# Patient Record
Sex: MALE | Race: WHITE | Employment: FULL TIME | URBAN - METROPOLITAN AREA
[De-identification: names, ages, dates, MRNs, and addresses within clinical notes are randomized per-mention and may not be internally consistent; named-entity substitution may affect disease eponyms.]

---

## 2020-04-21 ENCOUNTER — OFFICE VISIT (OUTPATIENT)
Dept: SURGERY | Age: 70
End: 2020-04-21

## 2020-04-21 VITALS
DIASTOLIC BLOOD PRESSURE: 71 MMHG | HEIGHT: 74 IN | WEIGHT: 207.6 LBS | RESPIRATION RATE: 16 BRPM | BODY MASS INDEX: 26.64 KG/M2 | TEMPERATURE: 98.6 F | OXYGEN SATURATION: 98 % | SYSTOLIC BLOOD PRESSURE: 124 MMHG | HEART RATE: 54 BPM

## 2020-04-21 DIAGNOSIS — K40.20 NON-RECURRENT BILATERAL INGUINAL HERNIA WITHOUT OBSTRUCTION OR GANGRENE: Primary | ICD-10-CM

## 2020-04-21 NOTE — PROGRESS NOTES
HISTORY OF PRESENT ILLNESS  Ginna Paige is a 77 y.o. male who comes in for consultation by Jean Marie Gimenez MD for a hernia  HPI  He noted left groin discomfort and swelling about a week ago. It goes down when he lays flat. He does not recall an inciting event and has not had anything similar. He has noted prior to this that he has urinary frequency and pressure. He is noticing it more. He denies associated nausea, vomiting, diarrhea, constipation, melena, hematochezia, dysuria or hematuria. He has not had surgery on the area previously. Past Medical History:   Diagnosis Date    Ill-defined condition     Dyslipidemia    Liver disease     Hepatitis 1969     Past Surgical History:   Procedure Laterality Date    CARDIAC SURG PROCEDURE UNLIST  2015    stent/Dr Saenz    HX HEENT      Tonsils    HX ORTHOPAEDIC  2000    R Knee Surgery/torn menicus/[facility]     Family History   Problem Relation Age of Onset    Cancer Mother         Lung CA, breast    Heart Disease Father     Hypertension Father     Heart Disease Sister     Elevated Lipids Sister     Elevated Lipids Brother      Social History     Tobacco Use    Smoking status: Never Smoker    Smokeless tobacco: Never Used   Substance Use Topics    Alcohol use: Yes     Alcohol/week: 1.7 standard drinks     Types: 2 Glasses of wine per week     Comment: rarely    Drug use: No     Current Outpatient Medications   Medication Sig    atorvastatin (LIPITOR) 40 mg tablet Take 1 tablet by mouth nightly.  aspirin delayed-release 81 mg tablet Take  by mouth daily.  ticagrelor (BRILINTA) 90 mg tablet Take 1 tablet by mouth two (2) times a day.  loratadine 10 mg cap Take  by mouth.  multivitamin with iron (DAILY MULTI-VITAMINS/IRON) tablet Take 1 tablet by mouth daily. No current facility-administered medications for this visit.       No Known Allergies    Review of Systems   Constitutional: Negative for chills, diaphoresis, fever, malaise/fatigue and weight loss. HENT: Negative for congestion, ear pain and sore throat. Eyes: Negative for blurred vision and pain. Respiratory: Negative for cough, hemoptysis, sputum production, shortness of breath, wheezing and stridor. Cardiovascular: Negative for chest pain, palpitations, orthopnea, claudication, leg swelling and PND. Gastrointestinal: Positive for abdominal pain. Negative for blood in stool, constipation, diarrhea, heartburn, melena, nausea and vomiting. Genitourinary: Negative for dysuria, flank pain, frequency, hematuria and urgency. Musculoskeletal: Negative for back pain, joint pain, myalgias and neck pain. Skin: Negative for itching and rash. Neurological: Negative for dizziness, tremors, focal weakness, seizures, weakness and headaches. Endo/Heme/Allergies: Negative for polydipsia. Psychiatric/Behavioral: Negative for depression and memory loss. The patient is not nervous/anxious. Visit Vitals  /71 (BP 1 Location: Left arm, BP Patient Position: Sitting)   Pulse (!) 54   Temp 98.6 °F (37 °C)   Resp 16   Ht 6' 2\" (1.88 m)   Wt 94.2 kg (207 lb 9.6 oz)   SpO2 98%   BMI 26.65 kg/m²       Physical Exam  Constitutional:       General: He is not in acute distress. Appearance: Normal appearance. He is well-developed. He is not diaphoretic. HENT:      Head: Normocephalic and atraumatic. Mouth/Throat:      Pharynx: No oropharyngeal exudate. Eyes:      General: No scleral icterus. Conjunctiva/sclera: Conjunctivae normal.      Pupils: Pupils are equal, round, and reactive to light. Neck:      Musculoskeletal: Normal range of motion and neck supple. Thyroid: No thyromegaly. Trachea: No tracheal deviation. Cardiovascular:      Rate and Rhythm: Normal rate and regular rhythm. Heart sounds: Normal heart sounds. No murmur. No friction rub. No gallop. Pulmonary:      Effort: Pulmonary effort is normal. No respiratory distress. Breath sounds: Normal breath sounds. No stridor. No wheezing or rales. Abdominal:      General: Bowel sounds are normal. There is no distension. Palpations: Abdomen is soft. There is no mass. Tenderness: There is no abdominal tenderness. There is no guarding or rebound. Hernia: A hernia is present. Hernia is present in the right inguinal area (small reducible) and left inguinal area (medium likely direct). There is no hernia in the umbilical area or ventral area. Genitourinary:     Penis: Circumcised. Scrotum/Testes: Normal. Cremasteric reflex is present. Musculoskeletal: Normal range of motion. General: No tenderness. Lymphadenopathy:      Cervical: No cervical adenopathy. Skin:     General: Skin is warm and dry. Findings: No erythema or rash. Neurological:      Mental Status: He is alert and oriented to person, place, and time. Cranial Nerves: No cranial nerve deficit. Coordination: Coordination normal.   Psychiatric:         Behavior: Behavior normal.         Thought Content: Thought content normal.         Judgment: Judgment normal.         ASSESSMENT and PLAN  1. Bilateral (left>>right) inguinal herniae. I explained about the anatomy and pathophysiology of hernias and the risk of incarceration and strangulation of the bowel. I explained about hernia repairs (open with and without mesh, and robotic assisted and laparoscopic with mesh). I explained the risks and benefits of repair including bleeding, infection, chronic pain, orchalgia, loss of testes, bowel or bladder injury, hernia recurrence, seroma, mesh infection requiring removal.  I explained it would be a six to eight week recuperation with no driving for 5 - 7 days, no lifting for six weeks. 2.  CAD  Hx PCI 2015. Followed by Dr Vinod Bryan. Last stress test in fall 2019 was ok  On statin and ASA  3. Possible BPH symptoms.   Higher risk for post op urinary retention    He wishes to proceed with a laparoscopic totally extraperitoneal preperitoneal bilateral inguinal hernia repair with mesh under general anesthesia as an outpatient     Due to the COVID-19 pandemic elective surgery is on hold and we will schedule for June with possibility of changing it if needed    Donita Alexandra MD FACS

## 2020-04-21 NOTE — PATIENT INSTRUCTIONS

## 2020-04-21 NOTE — H&P (VIEW-ONLY)
HISTORY OF PRESENT ILLNESS Katrina He is a 77 y.o. male who comes in for consultation by Ritchie Wilkerson MD for a hernia HPI He noted left groin discomfort and swelling about a week ago. It goes down when he lays flat. He does not recall an inciting event and has not had anything similar. He has noted prior to this that he has urinary frequency and pressure. He is noticing it more. He denies associated nausea, vomiting, diarrhea, constipation, melena, hematochezia, dysuria or hematuria. He has not had surgery on the area previously. Past Medical History:  
Diagnosis Date  Ill-defined condition Dyslipidemia  Liver disease Hepatitis 1969 Past Surgical History:  
Procedure Laterality Date  CARDIAC SURG PROCEDURE UNLIST  2015  
 stent/Dr [place] Byers  HX HEENT Tonsils [address] Knee Surgery/torn menicus/[facility] Family History Problem Relation Age of Onset  Cancer Mother Lung CA, breast  
 Heart Disease Father  Hypertension Father  Heart Disease Sister  Elevated Lipids Sister  Elevated Lipids Brother Social History Tobacco Use  Smoking status: Never Smoker  Smokeless tobacco: Never Used Substance Use Topics  Alcohol use: Yes Alcohol/week: 1.7 standard drinks Types: 2 Glasses of wine per week Comment: rarely  Drug use: No  
 
Current Outpatient Medications Medication Sig  
 atorvastatin (LIPITOR) 40 mg tablet Take 1 tablet by mouth nightly.  aspirin delayed-release 81 mg tablet Take  by mouth daily.  ticagrelor (BRILINTA) 90 mg tablet Take 1 tablet by mouth two (2) times a day.  loratadine 10 mg cap Take  by mouth.  multivitamin with iron (DAILY MULTI-VITAMINS/IRON) tablet Take 1 tablet by mouth daily. No current facility-administered medications for this visit. No Known Allergies Review of Systems Constitutional: Negative for chills, diaphoresis, fever, malaise/fatigue and weight loss. HENT: Negative for congestion, ear pain and sore throat. Eyes: Negative for blurred vision and pain. Respiratory: Negative for cough, hemoptysis, sputum production, shortness of breath, wheezing and stridor. Cardiovascular: Negative for chest pain, palpitations, orthopnea, claudication, leg swelling and PND. Gastrointestinal: Positive for abdominal pain. Negative for blood in stool, constipation, diarrhea, heartburn, melena, nausea and vomiting. Genitourinary: Negative for dysuria, flank pain, frequency, hematuria and urgency. Musculoskeletal: Negative for back pain, joint pain, myalgias and neck pain. Skin: Negative for itching and rash. Neurological: Negative for dizziness, tremors, focal weakness, seizures, weakness and headaches. Endo/Heme/Allergies: Negative for polydipsia. Psychiatric/Behavioral: Negative for depression and memory loss. The patient is not nervous/anxious. Visit Vitals /71 (BP 1 Location: Left arm, BP Patient Position: Sitting) Pulse (!) 54 Temp 98.6 °F (37 °C) Resp 16 Ht 6' 2\" (1.88 m) Wt 94.2 kg (207 lb 9.6 oz) SpO2 98% BMI 26.65 kg/m² Physical Exam 
Constitutional:   
   General: He is not in acute distress. Appearance: Normal appearance. He is well-developed. He is not diaphoretic. HENT:  
   Head: Normocephalic and atraumatic. Mouth/Throat:  
   Pharynx: No oropharyngeal exudate. Eyes:  
   General: No scleral icterus. Conjunctiva/sclera: Conjunctivae normal.  
   Pupils: Pupils are equal, round, and reactive to light. Neck: Musculoskeletal: Normal range of motion and neck supple. Thyroid: No thyromegaly. Trachea: No tracheal deviation. Cardiovascular:  
   Rate and Rhythm: Normal rate and regular rhythm. Heart sounds: Normal heart sounds. No murmur. No friction rub. No gallop. Pulmonary: Effort: Pulmonary effort is normal. No respiratory distress. Breath sounds: Normal breath sounds. No stridor. No wheezing or rales. Abdominal:  
   General: Bowel sounds are normal. There is no distension. Palpations: Abdomen is soft. There is no mass. Tenderness: There is no abdominal tenderness. There is no guarding or rebound. Hernia: A hernia is present. Hernia is present in the right inguinal area (small reducible) and left inguinal area (medium likely direct). There is no hernia in the umbilical area or ventral area. Genitourinary: 
   Penis: Circumcised. Scrotum/Testes: Normal. Cremasteric reflex is present. Musculoskeletal: Normal range of motion. General: No tenderness. Lymphadenopathy:  
   Cervical: No cervical adenopathy. Skin: 
   General: Skin is warm and dry. Findings: No erythema or rash. Neurological:  
   Mental Status: He is alert and oriented to person, place, and time. Cranial Nerves: No cranial nerve deficit. Coordination: Coordination normal.  
Psychiatric:     
   Behavior: Behavior normal.     
   Thought Content: Thought content normal.     
   Judgment: Judgment normal.  
 
 
 
ASSESSMENT and PLAN 1. Bilateral (left>>right) inguinal herniae. I explained about the anatomy and pathophysiology of hernias and the risk of incarceration and strangulation of the bowel. I explained about hernia repairs (open with and without mesh, and robotic assisted and laparoscopic with mesh). I explained the risks and benefits of repair including bleeding, infection, chronic pain, orchalgia, loss of testes, bowel or bladder injury, hernia recurrence, seroma, mesh infection requiring removal.  I explained it would be a six to eight week recuperation with no driving for 5 - 7 days, no lifting for six weeks. 2.  CAD  Hx PCI 2015. Followed by Dr Braden Loya. Last stress test in fall 2019 was ok On statin and ASA 3. Possible BPH symptoms. Higher risk for post op urinary retention He wishes to proceed with a laparoscopic totally extraperitoneal preperitoneal bilateral inguinal hernia repair with mesh under general anesthesia as an outpatient Due to the COVID-19 pandemic elective surgery is on hold and we will schedule for June with possibility of changing it if needed Kristy Vargas MD FACS

## 2020-05-01 ENCOUNTER — TELEPHONE (OUTPATIENT)
Dept: SURGERY | Age: 70
End: 2020-05-01

## 2020-05-01 DIAGNOSIS — N40.1 BENIGN PROSTATIC HYPERPLASIA WITH LOWER URINARY TRACT SYMPTOMS, SYMPTOM DETAILS UNSPECIFIED: Primary | ICD-10-CM

## 2020-05-01 RX ORDER — TAMSULOSIN HYDROCHLORIDE 0.4 MG/1
0.4 CAPSULE ORAL DAILY
Qty: 30 CAP | Refills: 0 | Status: SHIPPED | OUTPATIENT
Start: 2020-05-01 | End: 2021-04-26

## 2020-05-01 NOTE — TELEPHONE ENCOUNTER
Pre-op instructions reviewed with pt for upcoming hernia surgery on 5/7/20. Stop aspirin 81mgs. No nsaids as of today. PAT on 5/4/for labs/EKG. Arrival time for surgery 5:45am @[facility]. Expressed understanding.

## 2020-05-01 NOTE — TELEPHONE ENCOUNTER
Spoke with pt. Flomax called to [address] [place] [address]. Please take as directed. I will call him back to review pre-op instructions.

## 2020-05-01 NOTE — TELEPHONE ENCOUNTER
He wishes to proceed with a lap TEP BIH with mesh for 5/7  He has some BPH symptoms and will give rx for flomax perioperatively to attempt to mitigate post op urinary retention    My staff will call to schedule    Javon French MD FACS

## 2020-05-04 ENCOUNTER — HOSPITAL ENCOUNTER (OUTPATIENT)
Dept: PREADMISSION TESTING | Age: 70
Discharge: HOME | End: 2020-05-04
Attending: SURGERY
Payer: COMMERCIAL

## 2020-05-04 VITALS
BODY MASS INDEX: 26.68 KG/M2 | DIASTOLIC BLOOD PRESSURE: 70 MMHG | TEMPERATURE: 98.5 F | OXYGEN SATURATION: 97 % | HEART RATE: 60 BPM | HEIGHT: 74 IN | WEIGHT: 207.89 LBS | SYSTOLIC BLOOD PRESSURE: 149 MMHG

## 2020-05-04 LAB
ANION GAP SERPL CALC-SCNC: 4 MMOL/L (ref 5–15)
BUN SERPL-MCNC: 15 MG/DL (ref 6–20)
BUN/CREAT SERPL: 13 (ref 12–20)
CALCIUM SERPL-MCNC: 8.5 MG/DL (ref 8.5–10.1)
CHLORIDE SERPL-SCNC: 110 MMOL/L (ref 97–108)
CO2 SERPL-SCNC: 28 MMOL/L (ref 21–32)
CREAT SERPL-MCNC: 1.15 MG/DL (ref 0.7–1.3)
ERYTHROCYTE [DISTWIDTH] IN BLOOD BY AUTOMATED COUNT: 11.9 % (ref 11.5–14.5)
GLUCOSE SERPL-MCNC: 98 MG/DL (ref 65–100)
HCT VFR BLD AUTO: 41.1 % (ref 36.6–50.3)
HGB BLD-MCNC: 14.3 G/DL (ref 12.1–17)
MCH RBC QN AUTO: 33.7 PG (ref 26–34)
MCHC RBC AUTO-ENTMCNC: 34.8 G/DL (ref 30–36.5)
MCV RBC AUTO: 96.9 FL (ref 80–99)
NRBC # BLD: 0 K/UL (ref 0–0.01)
NRBC BLD-RTO: 0 PER 100 WBC
PLATELET # BLD AUTO: 151 K/UL (ref 150–400)
PMV BLD AUTO: 10 FL (ref 8.9–12.9)
POTASSIUM SERPL-SCNC: 4.1 MMOL/L (ref 3.5–5.1)
RBC # BLD AUTO: 4.24 M/UL (ref 4.1–5.7)
SODIUM SERPL-SCNC: 142 MMOL/L (ref 136–145)
WBC # BLD AUTO: 4.5 K/UL (ref 4.1–11.1)

## 2020-05-04 PROCEDURE — 87635 SARS-COV-2 COVID-19 AMP PRB: CPT

## 2020-05-04 PROCEDURE — 93005 ELECTROCARDIOGRAM TRACING: CPT

## 2020-05-04 PROCEDURE — 36415 COLL VENOUS BLD VENIPUNCTURE: CPT

## 2020-05-04 PROCEDURE — 85027 COMPLETE CBC AUTOMATED: CPT

## 2020-05-04 PROCEDURE — 80048 BASIC METABOLIC PNL TOTAL CA: CPT

## 2020-05-04 RX ORDER — SODIUM CHLORIDE, SODIUM LACTATE, POTASSIUM CHLORIDE, CALCIUM CHLORIDE 600; 310; 30; 20 MG/100ML; MG/100ML; MG/100ML; MG/100ML
25 INJECTION, SOLUTION INTRAVENOUS CONTINUOUS
Status: CANCELLED | OUTPATIENT
Start: 2020-05-07

## 2020-05-04 NOTE — PERIOP NOTES
During PAT visit- pt did not have stent card- reminded him to bring day of surgery   Phone call to Dr Camille Roy office to request last office note

## 2020-05-04 NOTE — PERIOP NOTES
[facility]  Preoperative Instructions        Surgery Date May 7, 2020         Time of Arrival  0530  Contact # [id number]  1. On the day of your surgery, please report to the Surgical Services Registration Desk and sign in at your designated time. The Surgery Center is located to the right of the Emergency Room. 2. You must have someone with you to drive you home. You should not drive a car for 24 hours following surgery. Please make arrangements for a friend or family member to stay with you for the first 24 hours after your surgery. 3. Do not have anything to eat or drink (including water, gum, mints, coffee, juice) after midnight ?5/6/20? Eb Ra ? This may not apply to medications prescribed by your physician. ?(Please note below the special instructions with medications to take the morning of your procedure.)    4. We recommend you do not drink any alcoholic beverages for 24 hours before and after your surgery. 5. Contact your surgeons office for instructions on the following medications: non-steroidal anti-inflammatory drugs (i.e. Advil, Aleve), vitamins, and supplements. (Some surgeons will want you to stop these medications prior to surgery and others may allow you to take them)  **If you are currently taking Plavix, Coumadin, Aspirin and/or other blood-thinning agents, contact your surgeon for instructions. ** Your surgeon will partner with the physician prescribing these medications to determine if it is safe to stop or if you need to continue taking. Please do not stop taking these medications without instructions from your surgeon    6. Wear comfortable clothes. Wear glasses instead of contacts. Do not bring any money or jewelry. Please bring picture ID, insurance card, and any prearranged co-payment or hospital payment. Do not wear make-up, particularly mascara the morning of your surgery.   Do not wear nail polish, particularly if you are having foot /hand surgery. Wear your hair loose or down, no ponytails, buns, tiffanie pins or clips. All body piercings must be removed. Please shower with antibacterial soap for three consecutive days before and on the morning of surgery, but do not apply any lotions, powders or deodorants after the shower on the day of surgery. Please use a fresh towels after each shower. Please sleep in clean clothes and change bed linens the night before surgery. Please do not shave for 48 hours prior to surgery. Shaving of the face is acceptable. 7. You should understand that if you do not follow these instructions your surgery may be cancelled. If your physical condition changes (I.e. fever, cold or flu) please contact your surgeon as soon as possible. 8. It is important that you be on time. If a situation occurs where you may be late, please call [phone] (OR Holding Area). 9. If you have any questions and or problems, please call [phone] (Pre-admission Testing). 10. Your surgery time may be subject to change. You will receive a phone call the evening prior if your time changes. 11.  If having outpatient surgery, you must have someone to drive you here, stay with you during the duration of your stay, and to drive you home at time of discharge. 12.   In an effort to improve the efficiency, privacy, and safety for all of our Pre-op patients visitors are not allowed in the Holding area. Once you arrive and are registered your family/visitors will be asked to remain in the waiting room. The Pre-op staff will get you from the Surgical Waiting Area and will explain to you and your family/visitors that the Pre-op phase is beginning. The staff will answer any questions and provide instructions for tracking of the patient, by use of the existing tracking number and color-coded status board in the waiting room.   At this time the staff will also ask for your designated spokesperson information in the event that the physician or staff need to provide an update or obtain any pertinent information. The designated spokesperson will be notified if the physician needs to speak to family during the pre-operative phase. If at any time your family/visitors has questions or concerns they may approach the volunteer desk in the waiting area for assistance. Special Instructions: As a precaution [facility] has implemented a restricted visitation policy to limit to 1 visitor per patient. No visitors under the age of 15 will be permitted in the Hospital.  If you are ill, please call to reschedule your procedure. (For pediatric patients, 2 visitors per patient)    Follow surgeon instructions     Practice Incentive Spirometry twice a day (ten times each)      TAKE ALL MEDICATIONS DAY OF SURGERY EXCEPT:n/a      I understand a pre-operative phone call will be made to verify my surgery time. In the event that I am not available, I give permission for a message to be left on my answering service and/or with another person?   yes                       ___________________      __________   _________    (Signature of Patient)             (Witness)                (Date and Time)

## 2020-05-04 NOTE — PERIOP NOTES
Incentive Spirometer        Using the incentive spirometer helps expand the small air sacs of your lungs, helps you breathe deeply, and helps improve your lung function. Use your incentive spirometer twice a day (10 breaths each time) prior to surgery. How to Use Your Incentive Spirometer:  1. Hold the incentive spirometer in an upright position. 2. Breathe out as usual.   3. Place the mouthpiece in your mouth and seal your lips tightly around it. 4. Take a deep breath. Breathe in slowly and as deeply as possible. Keep the blue flow rate guide between the arrows. 5. Hold your breath as long as possible. Then exhale slowly and allow the piston to fall to the bottom of the column. 6. Rest for a few seconds and repeat steps one through five at least 10 times. PAT Tidal Volume______________2000____  x_________2_______  Date______5/4/20_________________    Isabel Honour THE INCENTIVE SPIROMETER WITH YOU TO THE HOSPITAL ON THE DAY OF YOUR SURGERY. Opportunity given to ask and answer questions as well as to observe return demonstration.     Patient signature_____________________________    Witness____________________________

## 2020-05-05 LAB
ATRIAL RATE: 64 BPM
CALCULATED P AXIS, ECG09: 90 DEGREES
CALCULATED R AXIS, ECG10: 60 DEGREES
CALCULATED T AXIS, ECG11: 45 DEGREES
DIAGNOSIS, 93000: NORMAL
P-R INTERVAL, ECG05: 152 MS
Q-T INTERVAL, ECG07: 432 MS
QRS DURATION, ECG06: 92 MS
QTC CALCULATION (BEZET), ECG08: 445 MS
SARS-COV-2, COV2NT: NOT DETECTED
SPECIMEN SOURCE, FCOV2M: NORMAL
VENTRICULAR RATE, ECG03: 64 BPM

## 2020-05-07 ENCOUNTER — HOSPITAL ENCOUNTER (OUTPATIENT)
Age: 70
Setting detail: OUTPATIENT SURGERY
Discharge: HOME | End: 2020-05-07
Attending: SURGERY | Admitting: SURGERY
Payer: COMMERCIAL

## 2020-05-07 ENCOUNTER — ANESTHESIA EVENT (OUTPATIENT)
Dept: SURGERY | Age: 70
End: 2020-05-07
Payer: COMMERCIAL

## 2020-05-07 ENCOUNTER — ANESTHESIA (OUTPATIENT)
Dept: SURGERY | Age: 70
End: 2020-05-07
Payer: COMMERCIAL

## 2020-05-07 VITALS
OXYGEN SATURATION: 98 % | WEIGHT: 207.89 LBS | SYSTOLIC BLOOD PRESSURE: 139 MMHG | BODY MASS INDEX: 26.68 KG/M2 | RESPIRATION RATE: 13 BRPM | TEMPERATURE: 97.7 F | HEIGHT: 74 IN | DIASTOLIC BLOOD PRESSURE: 69 MMHG | HEART RATE: 84 BPM

## 2020-05-07 DIAGNOSIS — K40.20 NON-RECURRENT BILATERAL INGUINAL HERNIA WITHOUT OBSTRUCTION OR GANGRENE: Primary | ICD-10-CM

## 2020-05-07 PROCEDURE — 77030020829: Performed by: SURGERY

## 2020-05-07 PROCEDURE — C1727 CATH, BAL TIS DIS, NON-VAS: HCPCS | Performed by: SURGERY

## 2020-05-07 PROCEDURE — C1781 MESH (IMPLANTABLE): HCPCS | Performed by: SURGERY

## 2020-05-07 PROCEDURE — 77030026438 HC STYL ET INTUB CARD -A: Performed by: ANESTHESIOLOGY

## 2020-05-07 PROCEDURE — 77030040922 HC BLNKT HYPOTHRM STRY -A

## 2020-05-07 PROCEDURE — 74011000250 HC RX REV CODE- 250: Performed by: SURGERY

## 2020-05-07 PROCEDURE — 74011250637 HC RX REV CODE- 250/637: Performed by: SURGERY

## 2020-05-07 PROCEDURE — 77030020061 HC IV BLD WRMR ADMIN SET 3M -B: Performed by: ANESTHESIOLOGY

## 2020-05-07 PROCEDURE — 74011000250 HC RX REV CODE- 250: Performed by: REGISTERED NURSE

## 2020-05-07 PROCEDURE — 77030031139 HC SUT VCRL2 J&J -A: Performed by: SURGERY

## 2020-05-07 PROCEDURE — 77030010507 HC ADH SKN DERMBND J&J -B: Performed by: SURGERY

## 2020-05-07 PROCEDURE — 77030008684 HC TU ET CUF COVD -B: Performed by: ANESTHESIOLOGY

## 2020-05-07 PROCEDURE — 74011250636 HC RX REV CODE- 250/636: Performed by: REGISTERED NURSE

## 2020-05-07 PROCEDURE — 77030011640 HC PAD GRND REM COVD -A: Performed by: SURGERY

## 2020-05-07 PROCEDURE — 74011250636 HC RX REV CODE- 250/636: Performed by: SURGERY

## 2020-05-07 PROCEDURE — 77030040361 HC SLV COMPR DVT MDII -B: Performed by: SURGERY

## 2020-05-07 PROCEDURE — 77030010299 HC STPLR INT COVD -E: Performed by: SURGERY

## 2020-05-07 PROCEDURE — 76210000020 HC REC RM PH II FIRST 0.5 HR: Performed by: SURGERY

## 2020-05-07 PROCEDURE — 77030019908 HC STETH ESOPH SIMS -A: Performed by: ANESTHESIOLOGY

## 2020-05-07 PROCEDURE — 76010000149 HC OR TIME 1 TO 1.5 HR: Performed by: SURGERY

## 2020-05-07 PROCEDURE — 74011250636 HC RX REV CODE- 250/636: Performed by: ANESTHESIOLOGY

## 2020-05-07 PROCEDURE — 76060000033 HC ANESTHESIA 1 TO 1.5 HR: Performed by: SURGERY

## 2020-05-07 PROCEDURE — 77030008606 HC TRCR ENDOSC KII AMR -B: Performed by: SURGERY

## 2020-05-07 PROCEDURE — 76210000016 HC OR PH I REC 1 TO 1.5 HR: Performed by: SURGERY

## 2020-05-07 DEVICE — BARD MESH, 6" X 6" (15 CM X 15 CM)
Type: IMPLANTABLE DEVICE | Site: GROIN | Status: FUNCTIONAL
Brand: BARD

## 2020-05-07 RX ORDER — SODIUM CHLORIDE, SODIUM LACTATE, POTASSIUM CHLORIDE, CALCIUM CHLORIDE 600; 310; 30; 20 MG/100ML; MG/100ML; MG/100ML; MG/100ML
25 INJECTION, SOLUTION INTRAVENOUS CONTINUOUS
Status: DISCONTINUED | OUTPATIENT
Start: 2020-05-07 | End: 2020-05-07 | Stop reason: HOSPADM

## 2020-05-07 RX ORDER — BUPIVACAINE HYDROCHLORIDE AND EPINEPHRINE 5; 5 MG/ML; UG/ML
INJECTION, SOLUTION EPIDURAL; INTRACAUDAL; PERINEURAL AS NEEDED
Status: DISCONTINUED | OUTPATIENT
Start: 2020-05-07 | End: 2020-05-07 | Stop reason: HOSPADM

## 2020-05-07 RX ORDER — HYDROMORPHONE HYDROCHLORIDE 2 MG/ML
INJECTION, SOLUTION INTRAMUSCULAR; INTRAVENOUS; SUBCUTANEOUS AS NEEDED
Status: DISCONTINUED | OUTPATIENT
Start: 2020-05-07 | End: 2020-05-07 | Stop reason: HOSPADM

## 2020-05-07 RX ORDER — EPHEDRINE SULFATE/0.9% NACL/PF 50 MG/5 ML
SYRINGE (ML) INTRAVENOUS AS NEEDED
Status: DISCONTINUED | OUTPATIENT
Start: 2020-05-07 | End: 2020-05-07 | Stop reason: HOSPADM

## 2020-05-07 RX ORDER — SODIUM CHLORIDE 0.9 % (FLUSH) 0.9 %
5-40 SYRINGE (ML) INJECTION AS NEEDED
Status: DISCONTINUED | OUTPATIENT
Start: 2020-05-07 | End: 2020-05-07 | Stop reason: HOSPADM

## 2020-05-07 RX ORDER — FENTANYL CITRATE 50 UG/ML
25 INJECTION, SOLUTION INTRAMUSCULAR; INTRAVENOUS
Status: DISCONTINUED | OUTPATIENT
Start: 2020-05-07 | End: 2020-05-07 | Stop reason: HOSPADM

## 2020-05-07 RX ORDER — HYDROMORPHONE HYDROCHLORIDE 1 MG/ML
0.2 INJECTION, SOLUTION INTRAMUSCULAR; INTRAVENOUS; SUBCUTANEOUS
Status: DISCONTINUED | OUTPATIENT
Start: 2020-05-07 | End: 2020-05-07 | Stop reason: HOSPADM

## 2020-05-07 RX ORDER — SUCCINYLCHOLINE CHLORIDE 20 MG/ML
INJECTION INTRAMUSCULAR; INTRAVENOUS AS NEEDED
Status: DISCONTINUED | OUTPATIENT
Start: 2020-05-07 | End: 2020-05-07 | Stop reason: HOSPADM

## 2020-05-07 RX ORDER — DEXAMETHASONE SODIUM PHOSPHATE 4 MG/ML
INJECTION, SOLUTION INTRA-ARTICULAR; INTRALESIONAL; INTRAMUSCULAR; INTRAVENOUS; SOFT TISSUE AS NEEDED
Status: DISCONTINUED | OUTPATIENT
Start: 2020-05-07 | End: 2020-05-07 | Stop reason: HOSPADM

## 2020-05-07 RX ORDER — LIDOCAINE HYDROCHLORIDE 10 MG/ML
0.1 INJECTION, SOLUTION EPIDURAL; INFILTRATION; INTRACAUDAL; PERINEURAL AS NEEDED
Status: DISCONTINUED | OUTPATIENT
Start: 2020-05-07 | End: 2020-05-07 | Stop reason: HOSPADM

## 2020-05-07 RX ORDER — SODIUM CHLORIDE 0.9 % (FLUSH) 0.9 %
5-40 SYRINGE (ML) INJECTION EVERY 8 HOURS
Status: DISCONTINUED | OUTPATIENT
Start: 2020-05-07 | End: 2020-05-07 | Stop reason: HOSPADM

## 2020-05-07 RX ORDER — PROPOFOL 10 MG/ML
INJECTION, EMULSION INTRAVENOUS AS NEEDED
Status: DISCONTINUED | OUTPATIENT
Start: 2020-05-07 | End: 2020-05-07 | Stop reason: HOSPADM

## 2020-05-07 RX ORDER — ROCURONIUM BROMIDE 10 MG/ML
INJECTION, SOLUTION INTRAVENOUS AS NEEDED
Status: DISCONTINUED | OUTPATIENT
Start: 2020-05-07 | End: 2020-05-07 | Stop reason: HOSPADM

## 2020-05-07 RX ORDER — POLYETHYLENE GLYCOL 3350 17 G/17G
17 POWDER, FOR SOLUTION ORAL 2 TIMES DAILY
Qty: 60 PACKET | Refills: 0 | Status: SHIPPED | OUTPATIENT
Start: 2020-05-07 | End: 2020-06-06

## 2020-05-07 RX ORDER — FENTANYL CITRATE 50 UG/ML
INJECTION, SOLUTION INTRAMUSCULAR; INTRAVENOUS AS NEEDED
Status: DISCONTINUED | OUTPATIENT
Start: 2020-05-07 | End: 2020-05-07 | Stop reason: HOSPADM

## 2020-05-07 RX ORDER — MIDAZOLAM HYDROCHLORIDE 1 MG/ML
INJECTION, SOLUTION INTRAMUSCULAR; INTRAVENOUS AS NEEDED
Status: DISCONTINUED | OUTPATIENT
Start: 2020-05-07 | End: 2020-05-07 | Stop reason: HOSPADM

## 2020-05-07 RX ORDER — OXYCODONE AND ACETAMINOPHEN 5; 325 MG/1; MG/1
1 TABLET ORAL
Qty: 15 TAB | Refills: 0 | Status: SHIPPED | OUTPATIENT
Start: 2020-05-07 | End: 2020-05-12

## 2020-05-07 RX ORDER — KETOROLAC TROMETHAMINE 30 MG/ML
INJECTION, SOLUTION INTRAMUSCULAR; INTRAVENOUS AS NEEDED
Status: DISCONTINUED | OUTPATIENT
Start: 2020-05-07 | End: 2020-05-07 | Stop reason: HOSPADM

## 2020-05-07 RX ORDER — IBUPROFEN 600 MG/1
600 TABLET ORAL
Qty: 30 TAB | Refills: 0 | Status: SHIPPED | OUTPATIENT
Start: 2020-05-07 | End: 2020-05-17

## 2020-05-07 RX ORDER — LIDOCAINE HYDROCHLORIDE 20 MG/ML
INJECTION, SOLUTION EPIDURAL; INFILTRATION; INTRACAUDAL; PERINEURAL AS NEEDED
Status: DISCONTINUED | OUTPATIENT
Start: 2020-05-07 | End: 2020-05-07 | Stop reason: HOSPADM

## 2020-05-07 RX ORDER — DIPHENHYDRAMINE HYDROCHLORIDE 50 MG/ML
12.5 INJECTION, SOLUTION INTRAMUSCULAR; INTRAVENOUS AS NEEDED
Status: DISCONTINUED | OUTPATIENT
Start: 2020-05-07 | End: 2020-05-07 | Stop reason: HOSPADM

## 2020-05-07 RX ORDER — ONDANSETRON 2 MG/ML
INJECTION INTRAMUSCULAR; INTRAVENOUS AS NEEDED
Status: DISCONTINUED | OUTPATIENT
Start: 2020-05-07 | End: 2020-05-07 | Stop reason: HOSPADM

## 2020-05-07 RX ADMIN — HYDROMORPHONE HYDROCHLORIDE 1 MG: 2 INJECTION, SOLUTION INTRAMUSCULAR; INTRAVENOUS; SUBCUTANEOUS at 14:20

## 2020-05-07 RX ADMIN — LIDOCAINE HYDROCHLORIDE 100 MG: 20 INJECTION, SOLUTION INTRAVENOUS at 13:56

## 2020-05-07 RX ADMIN — ROCURONIUM BROMIDE 20 MG: 10 INJECTION INTRAVENOUS at 14:05

## 2020-05-07 RX ADMIN — MIDAZOLAM HYDROCHLORIDE 2 MG: 1 INJECTION, SOLUTION INTRAMUSCULAR; INTRAVENOUS at 13:46

## 2020-05-07 RX ADMIN — WATER 2 G: 1 INJECTION INTRAMUSCULAR; INTRAVENOUS; SUBCUTANEOUS at 13:46

## 2020-05-07 RX ADMIN — PROPOFOL 170 MG: 10 INJECTION, EMULSION INTRAVENOUS at 13:56

## 2020-05-07 RX ADMIN — SUCCINYLCHOLINE CHLORIDE 160 MG: 20 INJECTION, SOLUTION INTRAMUSCULAR; INTRAVENOUS at 13:56

## 2020-05-07 RX ADMIN — Medication 10 MG: at 13:59

## 2020-05-07 RX ADMIN — Medication 3 AMPULE: at 09:45

## 2020-05-07 RX ADMIN — DEXAMETHASONE SODIUM PHOSPHATE 8 MG: 4 INJECTION, SOLUTION INTRAMUSCULAR; INTRAVENOUS at 14:00

## 2020-05-07 RX ADMIN — SUGAMMADEX 200 MG: 100 INJECTION, SOLUTION INTRAVENOUS at 14:38

## 2020-05-07 RX ADMIN — ROCURONIUM BROMIDE 10 MG: 10 INJECTION INTRAVENOUS at 13:56

## 2020-05-07 RX ADMIN — FENTANYL CITRATE 100 MCG: 50 INJECTION, SOLUTION INTRAMUSCULAR; INTRAVENOUS at 13:56

## 2020-05-07 RX ADMIN — SODIUM CHLORIDE, SODIUM LACTATE, POTASSIUM CHLORIDE, AND CALCIUM CHLORIDE 25 ML/HR: 600; 310; 30; 20 INJECTION, SOLUTION INTRAVENOUS at 09:45

## 2020-05-07 RX ADMIN — ONDANSETRON HYDROCHLORIDE 7 MG: 2 INJECTION, SOLUTION INTRAMUSCULAR; INTRAVENOUS at 14:00

## 2020-05-07 RX ADMIN — KETOROLAC TROMETHAMINE 30 MG: 30 INJECTION, SOLUTION INTRAMUSCULAR; INTRAVENOUS at 14:29

## 2020-05-07 NOTE — PERIOP NOTES
[address] from Nurse    PATIENT INSTRUCTIONS:    After general anesthesia or intravenous sedation, for 24 hours or while taking prescription Narcotics:  · Limit your activities  · Do not drive and operate hazardous machinery  · Do not make important personal or business decisions  · Do  not drink alcoholic beverages  · If you have not urinated within 8 hours after discharge, please contact your surgeon on call. Report the following to your surgeon:  · Excessive pain, swelling, redness or odor of or around the surgical area  · Temperature over 100.5  · Nausea and vomiting lasting longer than 4 hours or if unable to take medications  · Any signs of decreased circulation or nerve impairment to extremity: change in color, persistent  numbness, tingling, coldness or increase pain  · Any questions        These are general instructions for a healthy lifestyle:    No smoking/ No tobacco products/ Avoid exposure to second hand smoke  Surgeon General's Warning:  Quitting smoking now greatly reduces serious risk to your health. Obesity, smoking, and sedentary lifestyle greatly increases your risk for illness    A healthy diet, regular physical exercise & weight monitoring are important for maintaining a healthy lifestyle    You may be retaining fluid if you have a history of heart failure or if you experience any of the following symptoms:  Weight gain of 3 pounds or more overnight or 5 pounds in a week, increased swelling in our hands or feet or shortness of breath while lying flat in bed. Please call your doctor as soon as you notice any of these symptoms; do not wait until your next office visit. The discharge information has been reviewed with the patient and spouse. The patient and spouse verbalized understanding.   Discharge medications reviewed with the patient and spouse and appropriate educational materials and side effects teaching were provided. ___________________________________________________________________________________________________________________________________    Reviewed instructions with pt's wife Isidoro Chapman. Isidoro Chapman signed paper copy of discharge instructions and copy placed on chart.

## 2020-05-07 NOTE — ANESTHESIA POSTPROCEDURE EVALUATION
Procedure(s):  LAPAROSCOPIC TOTALLY EXTRA PERITONEAL PREPERITONEAL BILATERAL INGUINAL HERNIA REPAIR WITH MESH. general    Anesthesia Post Evaluation        Patient location during evaluation: PACU  Note status: Adequate. Level of consciousness: responsive to verbal stimuli and sleepy but conscious  Pain management: satisfactory to patient  Airway patency: patent  Anesthetic complications: no  Cardiovascular status: acceptable  Respiratory status: acceptable  Hydration status: acceptable  Comments: +Post-Anesthesia Evaluation and Assessment    Patient: Brian Goldman MRN: [id number]  SSN: [id number]   YOB: 1950  Age: 77 y.o. Sex: male      Cardiovascular Function/Vital Signs    /70 (BP 1 Location: Right arm, BP Patient Position: At rest)   Pulse 84   Temp 36.9 °C (98.4 °F)   Resp 15   Ht 6' 2\" (1.88 m)   Wt 94.3 kg (207 lb 14.3 oz)   SpO2 93%   BMI 26.69 kg/m²     Patient is status post Procedure(s):  LAPAROSCOPIC TOTALLY EXTRA PERITONEAL PREPERITONEAL BILATERAL INGUINAL HERNIA REPAIR WITH MESH. Nausea/Vomiting: Controlled. Postoperative hydration reviewed and adequate. Pain:  Pain Scale 1: Numeric (0 - 10) (05/07/20 1545)  Pain Intensity 1: 3 (05/07/20 1545)   Managed. Neurological Status:   Neuro (WDL): Within Defined Limits (05/07/20 1455)   At baseline. Mental Status and Level of Consciousness: Arousable. Pulmonary Status:   O2 Device: Room air (05/07/20 1545)   Adequate oxygenation and airway patent. Complications related to anesthesia: None    Post-anesthesia assessment completed. No concerns. Signed By: Ellis Shoemaker MD    5/7/2020  Post anesthesia nausea and vomiting:  controlled      Vitals Value Taken Time   /70 5/7/2020  3:45 PM   Temp 36.9 °C (98.4 °F) 5/7/2020  3:01 PM   Pulse 87 5/7/2020  3:50 PM   Resp 14 5/7/2020  3:50 PM   SpO2 95 % 5/7/2020  3:50 PM   Vitals shown include unvalidated device data.

## 2020-05-07 NOTE — BRIEF OP NOTE
Brief Postoperative Note    Patient: Angela Felix  YOB: 1950  MRN: [id number]    Date of Procedure: 5/7/2020     Pre-Op Diagnosis: BILATERAL INGUINAL HERNIA    Post-Op Diagnosis: Same as preoperative diagnosis. Procedure(s):  LAPAROSCOPIC TOTALLY EXTRA PERITONEAL PREPERITONEAL BILATERAL INGUINAL HERNIA REPAIR WITH MESH    Surgeon(s):  Marilee Larsen MD    Surgical Assistant: None    Anesthesia: General     Estimated Blood Loss (mL): Minimal    Complications: None    Specimens: * No specimens in log *     Implants:   Implant Name Type Inv.  Item Serial No.  Lot No. LRB No. Used Action   MESH MARLEX [id number] HERNIA - SNA  MESH MARLEX [id number] HERNIA NA BARD Buster Else [id number]  1 Implanted       Drains: * No LDAs found *    Findings: bilateral indirect    Electronically Signed by Rupali Moser MD on 5/7/2020 at 2:45 PM

## 2020-05-07 NOTE — INTERVAL H&P NOTE
Update History & Physical 
 
The Patient's History and Physical of April 24, 2020 was reviewed with the patient and I examined the patient. There was no change. The surgical site was confirmed by the patient and me. Plan:  The risk, benefits, expected outcome, and alternative to the recommended procedure have been discussed with the patient. Patient understands and wants to proceed with the procedure. The patient was counseled at length about the risks of frederick Covid-19 in the bridger-operative and post-operative states including the recovery window of their procedure. The patient was made aware that frederick Covid-19 after a surgical procedure may worsen their prognosis for recovering from the virus and lend to a higher morbidity and or mortality risk. The patient was given the options of postponing their procedure. All of the risks, benefits, and alternatives were discussed. The patient  wishes to proceed with the procedure.  
 
Electronically signed by Kristy Vargas MD on 5/7/2020 at 12:58 PM

## 2020-05-07 NOTE — ANESTHESIA PREPROCEDURE EVALUATION
Relevant Problems   No relevant active problems       Anesthetic History   No history of anesthetic complications            Review of Systems / Medical History  Patient summary reviewed, nursing notes reviewed and pertinent labs reviewed    Pulmonary  Within defined limits                 Neuro/Psych   Within defined limits           Cardiovascular              CAD and cardiac stents    Exercise tolerance: >4 METS     GI/Hepatic/Renal           Liver disease     Endo/Other  Within defined limits           Other Findings   Comments: Bilateral inguinal hernia            Physical Exam    Airway  Mallampati: II  TM Distance: 4 - 6 cm  Neck ROM: normal range of motion   Mouth opening: Normal     Cardiovascular  Regular rate and rhythm,  S1 and S2 normal,  no murmur, click, rub, or gallop             Dental  No notable dental hx       Pulmonary  Breath sounds clear to auscultation               Abdominal  GI exam deferred       Other Findings            Anesthetic Plan    ASA: 3  Anesthesia type: general    Monitoring Plan: BIS      Induction: Intravenous  Anesthetic plan and risks discussed with: Patient

## 2020-05-07 NOTE — DISCHARGE INSTRUCTIONS
Discharge Instructions:  Hernia Repair    Dr. Meño Bentley    Call for appointment for follow up in 3 weeks [phone]    Activity:    Walk regularly. No lifting more than 5 to 10 pounds for 6 weeks. Light aerobic activity is okay when you feel up to it. You may resume driving in five days unless still requiring narcotics for pain. Work:    You may return to work in 2 or 3 weeks to light activity. No lifting more than 5 pounds for four weeks and no more than 10 pounds for an additional 2 weeks. Diet:    You may resume normal diet after 24 hours. Anesthesia and narcotics may cause nausea and vomiting. If persistent please call the office. Wound Care: You have a special dressing called Dermabond. It is okay to shower and let the water run over the incisions but do not scrub the area or soak in a tub. If you have a small amount of drainage you may place a dry bandage over the wound and change it daily. If you experience a lot of drainage, develop redness around the wound, or a fever over 101 F occurs please call the office. May use ice over incision for comfort. Medications:    Resume home medications as indicated on the Medical Reconciliation form. Aspirin and Coumadin can be restarted immediately if you were taking them preoperatively. If taking Plavix do not restart it until post operative day 2. Pain medications:  Non steroidal antiinflammatories seem to work best for post surgical pain. Try these first as prescribed. A narcotic prescription will also be given for breakthrough pain. Over the counter stool softeners and laxatives may be used if needed. Do not hesitate to call with questions or concerns. Patient Education      Ibuprofen (Advil, Advil Children's, Motrin, Children's Ibuprofen) - (By mouth)   Why this medicine is used:   Treats pain and fever. This medicine is an NSAID.   Contact a nurse or doctor right away if you have:  · Change in how much or how often you urinate  · Severe stomach pain, vomiting blood, bloody or black tarry stools  · Swelling in your hands, ankles, or feet; rapid weight gain     Common side effects:  · Constipation, diarrhea, gas, mild upset stomach  · Ringing in your ears, dizziness, headache  © 2017 [address] is for End User's use only and may not be sold, redistributed or otherwise used for commercial purposes. Patient Education      Oxycodone/Acetaminophen (Percocet, Roxicet) - (By mouth)   Why this medicine is used:   Treats pain. This medicine contains a narcotic pain reliever. Contact a nurse or doctor right away if you have:  · Extreme weakness, shallow breathing, slow heartbeat  · Sweating or cold, clammy skin  · Skin blisters, rash, or peeling     Common side effects:  · Constipation  · Nausea, vomiting  · Tiredness  © 2017 [employer] [employer] Information is for End User's use only and may not be sold, redistributed or otherwise used for commercial purposes. Patient Education      Polyethylene Glycol 3350 (MiraLAX, Healthylax Polyethylene Glycol 3350, GaviLAX, Leader ClearLax) - (By mouth)   Why this medicine is used:   Treats occasional constipation. Contact a nurse or doctor right away if you have:  · Severe stomach pain, bloating, vomiting, diarrhea     Common side effects:  · Mild cramps, bloating, diarrhea, gas  © 2017 [address] [employer] Information is for End User's use only and may not be sold, redistributed or otherwise used for commercial purposes. How to Care for Your Wound After Its Treated With  DERMABOND* Topical Skin Adhesive  DERMABOND* Topical Skin Adhesive (2-octyl cyanoacrylate) is a sterile, liquid skin adhesive  that holds wound edges together. The film will usually remain in place for 5 to 10 days, then  naturally fall off your skin.   The following will answer some of your questions and provide instructions for proper care for your  wound while it is healing:    CHECK WOUND APPEARANCE   Some swelling, redness, and pain are common with all wounds and normally will go away as the  wound heals. If swelling, redness, or pain increases or if the wound feels warm to the touch,  contact a doctor. Also contact a doctor if the wound edges reopen or separate. REPLACE BANDAGES   If your wound is bandaged, keep the bandage dry.  Replace the dressing daily until the adhesive film has fallen off or if the  bandage should become wet, unless otherwise instructed by your  physician.  When changing the dressing, do not place tape directly over the  DERMABOND adhesive film, because removing the tape later may also  remove the film. AVOID TOPICAL MEDICATIONS   Do not apply liquid or ointment medications or any other product to your wound while the  DERMABOND adhesive film is in place. These may loosen the film before your wound is healed. KEEP WOUND DRY AND PROTECTED   You may occasionally and briefly wet your wound in the shower or bath. Do not soak or scrub  your wound, do not swim, and avoid periods of heavy perspiration until the DERMABOND  adhesive has naturally fallen off. After showering or bathing, gently blot your wound dry with a  soft towel. If a protective dressing is being used, apply a fresh, dry bandage, being sure to keep  the tape off the DERMABOND adhesive film.  Apply a clean, dry bandage over the wound if necessary to protect it.  Protect your wound from injury until the skin has had sufficient time to heal.   Do not scratch, rub, or pick at the DERMABOND adhesive film. This may loosen the film before  your wound is healed.  Protect the wound from prolonged exposure to sunlight or tanning lamps while the film is in  place. If you have any questions or concerns about this product, please consult your doctor.   *Trademark ©[employer] 2002      DISCHARGE SUMMARY from Nurse    PATIENT INSTRUCTIONS:    After general anesthesia or intravenous sedation, for 24 hours or while taking prescription Narcotics:  · Limit your activities  · Do not drive and operate hazardous machinery  · Do not make important personal or business decisions  · Do  not drink alcoholic beverages  · If you have not urinated within 8 hours after discharge, please contact your surgeon on call. Report the following to your surgeon:  · Excessive pain, swelling, redness or odor of or around the surgical area  · Temperature over 100.5  · Nausea and vomiting lasting longer than 4 hours or if unable to take medications  · Any signs of decreased circulation or nerve impairment to extremity: change in color, persistent  numbness, tingling, coldness or increase pain  · Any questions      These are general instructions for a healthy lifestyle:    No smoking/ No tobacco products/ Avoid exposure to second hand smoke  Surgeon General's Warning:  Quitting smoking now greatly reduces serious risk to your health. Obesity, smoking, and sedentary lifestyle greatly increases your risk for illness    A healthy diet, regular physical exercise & weight monitoring are important for maintaining a healthy lifestyle    You may be retaining fluid if you have a history of heart failure or if you experience any of the following symptoms:  Weight gain of 3 pounds or more overnight or 5 pounds in a week, increased swelling in our hands or feet or shortness of breath while lying flat in bed. Please call your doctor as soon as you notice any of these symptoms; do not wait until your next office visit.

## 2020-05-07 NOTE — PERIOP NOTES
Handoff Report from Operating Room to PACU    Report received from [facility] and [place] CRNA regarding Konstantin Marks. Surgeon(s):  Galo Peña MD  And Procedure(s) (LRB):  LAPAROSCOPIC TOTALLY EXTRA PERITONEAL PREPERITONEAL BILATERAL INGUINAL HERNIA REPAIR WITH MESH (Bilateral)  confirmed   with allergies and dressings discussed. Anesthesia type, drugs, patient history, complications, estimated blood loss, vital signs, intake and output, and last pain medication, lines and temperature were reviewed.

## 2020-05-08 NOTE — OP NOTES
[facility]  OPERATIVE REPORT    Name:  Brandon Camejo  MR#:  [id number]  :  1950  ACCOUNT #:  [de-identified]  DATE OF SERVICE:  2020    PREOPERATIVE DIAGNOSIS:  Bilateral inguinal hernias. POSTOPERATIVE DIAGNOSIS:  Bilateral inguinal hernias. PROCEDURE PERFORMED:  Laparoscopic totally extraperitoneal, preperitoneal bilateral inguinal hernias repair with mesh. SURGEON:  Krysta Lincoln MD    ASSISTANT:  staff    ANESTHESIA:  General.    COMPLICATIONS:  None. SPECIMENS REMOVED:  None. IMPLANTS:  Bard Davol hernia Marlex mesh 6 x 6 inches, cut in half, created two 3 x 6 inches pieces of mesh, one piece was placed on each side, lot# [id number]. ESTIMATED BLOOD LOSS:  10 mL. FINDINGS:  Bilateral indirect defects. BRIEF HISTORY:  The patient is a pleasant 59-year-old gentleman with symptomatic inguinal hernias. Options were discussed and he elected to undergo repair. He understands the risks and benefits and wished to proceed. PROCEDURE:  The patient was taken to the operating room, placed on the operating table in the supine position, and underwent general anesthesia. The arms were tucked and padded at the side and the abdomen was prepped and draped in the usual sterile fashion. After an appropriate time-out and antibiotics were given, 0.5% Marcaine with epinephrine was infiltrated into the skin and subcutaneous tissues inferior to the umbilicus also, 1-cm medially and 1-cm superiorly to the anterior superior iliac crest on each side for regional nerve block. A vertical midline incision was made inferior to the umbilicus and dissected down to the subcutaneous tissues and through the anterior rectus sheath just to the left of the midline. The preperitoneal space was entered and an Auto[employer] Covidien PDS balloon dissecting system was inserted in the preperitoneal space and this gave good dissection of the tissue planes.   This was subsequently removed. A 5-mm trocar was placed in the lower midline and one in the left lower quadrant and the preperitoneal space. There was subsequently a structural balloon in the midline at the infraumbilical trocar site. We subsequently dissected out both sides. The bilateral indirect defects were dissected away from the tissues allowing them to fall back and skeletonizing the cord on both sides. A 6 x 6 inch pied of the Marlex mesh and cut in half, created two 3 x 6 inches pieces of mesh. A slit was created one-third of the way down the long end for creation of an internal ring. Each piece was subsequently rolled up and inserted into the preperitoneal space and unraveled with the long end medially and the tail laterally. Utilizing a [employer] device, we tacked it inferior to the pubis, then down to the Alexis's ligament, and anterior to the anterior abdominal wall. We wrapped the mesh around the cord structures and tacked it back to itself. It was snug with knot tied to the internal ring. Both sides were done in a similar fashion without difficulty. Once that was completed, CO2 was allowed to be evacuated from the preperitoneal space and the trocars were removed. No evidence of any ongoing bleeding. Interrupted 0 Vicryl was used to approximate the fascia at the infraumbilical trocar site. More Marcaine was infiltrated around the infraumbilical trocar site and fascia. Interrupted 4-0 Vicryl was used to approximate the deep tissues and then a running 4-0 Vicryl was used to close the skin and a Dermabond dressing was applied. Upon completion of the operation, the needle, sponge and instrument counts were correct x2. The patient had tolerated the procedure well and was brought to the recovery room.         Jane Sanchez MD [id number]  D:  05/07/2020 14:49  T:  05/08/2020 0:32  JOB #:  [id number]  CC:  Castillo Lara MD Dr. _________

## 2020-05-27 ENCOUNTER — OFFICE VISIT (OUTPATIENT)
Dept: SURGERY | Age: 70
End: 2020-05-27

## 2020-05-27 VITALS
OXYGEN SATURATION: 98 % | HEIGHT: 74 IN | SYSTOLIC BLOOD PRESSURE: 144 MMHG | DIASTOLIC BLOOD PRESSURE: 80 MMHG | BODY MASS INDEX: 26.69 KG/M2 | TEMPERATURE: 96.3 F | HEART RATE: 52 BPM

## 2020-05-27 DIAGNOSIS — Z09 POSTOPERATIVE EXAMINATION: Primary | ICD-10-CM

## 2020-05-27 NOTE — PROGRESS NOTES
Chief Complaint   Patient presents with    Surgical Follow-up      Laparoscopic totally extraperitoneal, preperitoneal bilateral inguinal hernias repair with mesh. 05/07/2020     1. Have you been to the ER, urgent care clinic since your last visit? Hospitalized since your last visit? No    2. Have you seen or consulted any other health care providers outside of the [address] [place] [address] since your last visit? Include any pap smears or colon screening.  No

## 2020-05-27 NOTE — PROGRESS NOTES
Surgery  Follow up  Procedure: lap TEP BIH with mesh  OR date:  /7/2020  Path:  none    S I feel fine just a little tender, no diarrhea    Visit Vitals  /80   Pulse (!) 52   Temp (!) 96.3 °F (35.7 °C)   Ht 6' 2\" (1.88 m)   SpO2 98%   BMI 26.69 kg/m²       O Incisions healing well without infection   No signs of hernia    A/P Doing well   No lifting for another 3 weeks   RTC 6 weeks or prn    Paul Martinez MD FACS

## 2022-03-19 PROBLEM — K40.20 NON-RECURRENT BILATERAL INGUINAL HERNIA WITHOUT OBSTRUCTION OR GANGRENE: Status: ACTIVE | Noted: 2020-04-21

## 2024-11-12 RX ORDER — ASPIRIN 81 MG/1
81 TABLET ORAL DAILY
COMMUNITY

## 2024-11-12 RX ORDER — ATORVASTATIN CALCIUM 40 MG/1
40 TABLET, FILM COATED ORAL DAILY
COMMUNITY

## 2024-11-13 ENCOUNTER — ANESTHESIA (OUTPATIENT)
Facility: HOSPITAL | Age: 74
End: 2024-11-13
Payer: MEDICARE

## 2024-11-13 ENCOUNTER — ANESTHESIA EVENT (OUTPATIENT)
Facility: HOSPITAL | Age: 74
End: 2024-11-13
Payer: MEDICARE

## 2024-11-13 ENCOUNTER — HOSPITAL ENCOUNTER (OUTPATIENT)
Facility: HOSPITAL | Age: 74
Setting detail: OUTPATIENT SURGERY
Discharge: HOME | End: 2024-11-13
Attending: STUDENT IN AN ORGANIZED HEALTH CARE EDUCATION/TRAINING PROGRAM | Admitting: STUDENT IN AN ORGANIZED HEALTH CARE EDUCATION/TRAINING PROGRAM
Payer: MEDICARE

## 2024-11-13 VITALS
HEIGHT: 74 IN | RESPIRATION RATE: 14 BRPM | TEMPERATURE: 97.6 F | SYSTOLIC BLOOD PRESSURE: 135 MMHG | DIASTOLIC BLOOD PRESSURE: 69 MMHG | HEART RATE: 52 BPM | OXYGEN SATURATION: 100 % | BODY MASS INDEX: 24.68 KG/M2 | WEIGHT: 192.3 LBS

## 2024-11-13 PROCEDURE — 88305 TISSUE EXAM BY PATHOLOGIST: CPT

## 2024-11-13 PROCEDURE — 7100000010 HC PHASE II RECOVERY - FIRST 15 MIN: Performed by: STUDENT IN AN ORGANIZED HEALTH CARE EDUCATION/TRAINING PROGRAM

## 2024-11-13 PROCEDURE — 2709999900 HC NON-CHARGEABLE SUPPLY: Performed by: STUDENT IN AN ORGANIZED HEALTH CARE EDUCATION/TRAINING PROGRAM

## 2024-11-13 PROCEDURE — 2720000010 HC SURG SUPPLY STERILE: Performed by: STUDENT IN AN ORGANIZED HEALTH CARE EDUCATION/TRAINING PROGRAM

## 2024-11-13 PROCEDURE — 7100000011 HC PHASE II RECOVERY - ADDTL 15 MIN: Performed by: STUDENT IN AN ORGANIZED HEALTH CARE EDUCATION/TRAINING PROGRAM

## 2024-11-13 PROCEDURE — 3700000000 HC ANESTHESIA ATTENDED CARE: Performed by: STUDENT IN AN ORGANIZED HEALTH CARE EDUCATION/TRAINING PROGRAM

## 2024-11-13 PROCEDURE — 2500000003 HC RX 250 WO HCPCS: Performed by: NURSE ANESTHETIST, CERTIFIED REGISTERED

## 2024-11-13 PROCEDURE — 3600007512: Performed by: STUDENT IN AN ORGANIZED HEALTH CARE EDUCATION/TRAINING PROGRAM

## 2024-11-13 PROCEDURE — 3600007502: Performed by: STUDENT IN AN ORGANIZED HEALTH CARE EDUCATION/TRAINING PROGRAM

## 2024-11-13 PROCEDURE — 2580000003 HC RX 258: Performed by: NURSE ANESTHETIST, CERTIFIED REGISTERED

## 2024-11-13 PROCEDURE — 6360000002 HC RX W HCPCS: Performed by: NURSE ANESTHETIST, CERTIFIED REGISTERED

## 2024-11-13 PROCEDURE — 3700000001 HC ADD 15 MINUTES (ANESTHESIA): Performed by: STUDENT IN AN ORGANIZED HEALTH CARE EDUCATION/TRAINING PROGRAM

## 2024-11-13 RX ORDER — 0.9 % SODIUM CHLORIDE 0.9 %
INTRAVENOUS SOLUTION INTRAVENOUS
Status: DISCONTINUED | OUTPATIENT
Start: 2024-11-13 | End: 2024-11-13 | Stop reason: SDUPTHER

## 2024-11-13 RX ORDER — SODIUM CHLORIDE 0.9 % (FLUSH) 0.9 %
5-40 SYRINGE (ML) INJECTION PRN
Status: DISCONTINUED | OUTPATIENT
Start: 2024-11-13 | End: 2024-11-13 | Stop reason: HOSPADM

## 2024-11-13 RX ORDER — SODIUM CHLORIDE 0.9 % (FLUSH) 0.9 %
5-40 SYRINGE (ML) INJECTION EVERY 12 HOURS SCHEDULED
Status: DISCONTINUED | OUTPATIENT
Start: 2024-11-13 | End: 2024-11-13 | Stop reason: HOSPADM

## 2024-11-13 RX ORDER — SODIUM CHLORIDE 9 MG/ML
25 INJECTION, SOLUTION INTRAVENOUS PRN
Status: DISCONTINUED | OUTPATIENT
Start: 2024-11-13 | End: 2024-11-13 | Stop reason: HOSPADM

## 2024-11-13 RX ADMIN — SODIUM CHLORIDE 250 ML: 9 INJECTION, SOLUTION INTRAVENOUS at 07:49

## 2024-11-13 RX ADMIN — PROPOFOL 100 MG: 10 INJECTION, EMULSION INTRAVENOUS at 07:42

## 2024-11-13 RX ADMIN — PROPOFOL 100 MG: 10 INJECTION, EMULSION INTRAVENOUS at 07:33

## 2024-11-13 RX ADMIN — PROPOFOL 100 MG: 10 INJECTION, EMULSION INTRAVENOUS at 07:28

## 2024-11-13 RX ADMIN — LIDOCAINE HYDROCHLORIDE 100 MG: 20 INJECTION, SOLUTION EPIDURAL; INFILTRATION; INTRACAUDAL; PERINEURAL at 07:28

## 2024-11-13 ASSESSMENT — PAIN - FUNCTIONAL ASSESSMENT: PAIN_FUNCTIONAL_ASSESSMENT: 0-10

## 2024-11-13 NOTE — OP NOTE
[facility]                  Colonoscopy Operative Report    11/13/2024      Gustavo Briones  [id number]  1950    Procedure Type:   Colonoscopy with polypectomy (cold biopsy)     Indications:    Screening colonoscopy     Pre-operative Diagnosis: see indication above    Post-operative Diagnosis:  See findings below    :  Jacki Mott MD    Referring Provider: JENNY Gallegos MD      Sedation:  MAC anesthesia Propofol    Pre-Procedural Exam:      Airway: clear,  No airway problems anticipated  Heart: RRR, without gallops or rubs  Lungs: clear bilaterally without wheezes, crackles, or rhonchi  Abdomen: soft, nontender, nondistended, bowel sounds present  Mental Status: awake, alert and oriented to person, place and time     Procedure Details:  After informed consent was obtained with all risks and benefits of procedure explained and preoperative exam completed, the patient was taken to the endoscopy suite and placed in the left lateral decubitus position.  Upon sequential sedation as per above, a digital rectal exam was performed .  The Olympus videocolonoscope  was inserted in the rectum and carefully advanced to the terminal ileum.  The cecum was identified by the ileocecal valve and appendiceal orifice.  The quality of preparation was good BBPS 3+3+3=9.  The colonoscope was slowly withdrawn with careful evaluation between folds. Retroflexion in the rectum was completed demonstrating internal hemorrhoids.     Findings:   Rectum: grade II internal hemorrhoids, no other mucosal abnormality  Sigmoid: mild diverticulosis;  Descending Colon: mild diverticulosis;  Transverse Colon: sessile 3mm polyp removed with biopsy forceps, otherwise normal mucosa  Ascending Colon: normal  Cecum: normal  Terminal Ileum: normal      Specimen Removed:   Jar 1) transverse colon polyp    Complications: None.     EBL:  None.    Impression:    3mm sessile transverse colon polyp

## 2024-11-13 NOTE — PERIOP NOTE
Endoscopy Case End Note:    0752:  Procedure scope was pre-cleaned, per protocol, at bedside by Jaime VILLEDA      0752:  Report received from anesthesia - Kip UGALDE.  See anesthesia flowsheet for intra-procedure vital signs and events.

## 2024-11-13 NOTE — ANESTHESIA PRE PROCEDURE
Department of Anesthesiology  Preprocedure Note       Name:  Gustavo Briones   Age:  74 y.o.  :  1950                                          MRN:  [id number]         Date:  2024      Surgeon: Surgeon(s):  Jacki Mott MD    Procedure: Procedure(s):  COLONOSCOPY WITH BIOPSY, POLYPECTOMY    Medications prior to admission:   Prior to Admission medications    Medication Sig Start Date End Date Taking? Authorizing Provider   atorvastatin (LIPITOR) 40 MG tablet Take 1 tablet by mouth daily   Yes Marce Carranza MD   aspirin 81 MG EC tablet Take 1 tablet by mouth daily   Yes ProviderMarce MD       Current medications:    No current facility-administered medications for this encounter.       Allergies:  No Known Allergies    Problem List:    Patient Active Problem List   Diagnosis Code   • Non-recurrent bilateral inguinal hernia without obstruction or gangrene K40.20       Past Medical History:        Diagnosis Date   • CAD (coronary artery disease)     STENT PLACED 2015   • Hyperlipidemia    • Ill-defined condition     Dyslipidemia   • Liver disease     Hepatitis        Past Surgical History:        Procedure Laterality Date   • HEENT      Tonsils   • HERNIA REPAIR  2020     Laparoscopic totally extraperitoneal, preperitoneal bilateral inguinal hernias repair with mesh.   • ORTHOPEDIC SURGERY      R Knee Surgery/torn menicus/[facility]V   • ORTHOPEDIC SURGERY Left     elet broken arm repair as a kid   • [place] UNLISTED PROCEDURE CARDIAC SURGERY      stent/Dr Soto       Social History:    Social History     Tobacco Use   • Smoking status: Never   • Smokeless tobacco: Never   Substance Use Topics   • Alcohol use: Not Currently     Alcohol/week: 1.7 standard drinks of alcohol                                Counseling given: Not Answered      Vital Signs (Current): There were no vitals filed for this visit.                                           BP Readings from Last 3

## 2024-11-13 NOTE — ANESTHESIA POSTPROCEDURE EVALUATION
Department of Anesthesiology  Postprocedure Note    Patient: Gustavo Briones  MRN: [id number]  YOB: 1950  Date of evaluation: 11/13/2024    Procedure Summary       Date: 11/13/24 Room / Location: [facility] ENDO 03 / [facility] ENDOSCOPY    Anesthesia Start: 0727 Anesthesia Stop: 0751    Procedure: COLONOSCOPY WITH BIOPSY, POLYPECTOMY (Lower GI Region) Diagnosis:       Colon cancer screening      (Colon cancer screening [Z12.11])    Surgeons: Jacki Mott MD Responsible Provider: Eugene Boyce MD    Anesthesia Type: MAC ASA Status: 3            Anesthesia Type: MAC    Len Phase I: Len Score: 10    Len Phase II:      Anesthesia Post Evaluation    Patient location during evaluation: PACU  Patient participation: complete - patient participated  Level of consciousness: awake  Airway patency: patent  Nausea & Vomiting: no vomiting  Cardiovascular status: hemodynamically stable  Respiratory status: acceptable  Hydration status: euvolemic    No notable events documented.

## 2024-11-13 NOTE — H&P
Gastroenterology History and Physical    Patient: Gustavo Briones    Physician: Jacki Mott MD    Vital Signs: Pulse 53, temperature 98.1 °F (36.7 °C), temperature source Temporal, resp. rate 16, height 1.88 m (6' 2\"), weight 87.2 kg (192 lb 4.8 oz), SpO2 99%.    Allergies: No Known Allergies    Procedure: colonoscopy    Indication: Screening for colon cancer    History:  Past Medical History:   Diagnosis Date    CAD (coronary artery disease)     STENT PLACED 2015    Hyperlipidemia     Ill-defined condition     Dyslipidemia    Liver disease     Hepatitis 1969      Past Surgical History:   Procedure Laterality Date    HEENT      Tonsils    HERNIA REPAIR  2020     Laparoscopic totally extraperitoneal, preperitoneal bilateral inguinal hernias repair with mesh.    ORTHOPEDIC SURGERY      R Knee Surgery/torn menicus/MCV    ORTHOPEDIC SURGERY Left     elet broken arm repair as a kid    [place] UNLISTED PROCEDURE CARDIAC SURGERY      stent/Dr Soto      Social History     Socioeconomic History    Marital status:      Spouse name: None    Number of children: None    Years of education: None    Highest education level: None   Tobacco Use    Smoking status: Never    Smokeless tobacco: Never   Substance and Sexual Activity    Alcohol use: Not Currently     Alcohol/week: 1.7 standard drinks of alcohol    Drug use: No      Family History   Problem Relation Age of Onset    Elevated Lipids Sister     Hypertension Brother     Heart Disease Father     Hypertension Father     Heart Disease Sister          from MI    Cancer Mother         Lung CA, breast    Elevated Lipids Brother        Medications:   Prior to Admission medications    Medication Sig Start Date End Date Taking? Authorizing Provider   atorvastatin (LIPITOR) 40 MG tablet Take 1 tablet by mouth daily   Yes Marce Carranza MD   aspirin 81 MG EC tablet Take 1 tablet by mouth daily   Yes Marce Carranza MD       Physical Exam:

## 2024-11-13 NOTE — PERIOP NOTE
ARRIVAL INFORMATION:  Verified patient name and date of birth, scheduled procedure, and informed consent.     : Yaquelin wife contact number: [phone]  Physician and staff can share information with the .     Receive texts: yes    Belongings with patient include:  Clothing,None    GI FOCUSED ASSESSMENT:  Neuro: Awake, alert, oriented x4  Respiratory: even and unlabored   GI: soft and non-distended  EKG Rhythm: sinus bradycardia    Education:Reviewed general discharge instructions and  information.

## (undated) DEVICE — STERILE POLYISOPRENE POWDER-FREE SURGICAL GLOVES: Brand: PROTEXIS

## (undated) DEVICE — TROCAR ENDOSCP L100MM DIA10MM STRUCTURAL BLLN FOR LAP

## (undated) DEVICE — SNARE ENDOSCP POLYP MED STD AD 2.4X27X240 CM 2.8 MM OVL SENS

## (undated) DEVICE — CONTAINER SPEC 20 ML LID NEUT BUFF FORMALIN 10 % POLYPR STS

## (undated) DEVICE — TIP SUCT TRNSPAR RIB SURF STD BLB RIG NVENT W/ 5IN1 CONN DYND50138] MEDLINE INDUSTRIES INC]

## (undated) DEVICE — TROCAR: Brand: KII FIOS FIRST ENTRY

## (undated) DEVICE — (D)PREP SKN CHLRAPRP APPL 26ML -- CONVERT TO ITEM 371833

## (undated) DEVICE — DISSECTOR ENDOSCP PREPERI KID SHP DISTENSION BLLN SPCMKR

## (undated) DEVICE — NEEDLE HYPO 25GA L1.5IN BVL ORIENTED ECLIPSE

## (undated) DEVICE — FIAPC® PROBE W/ FILTER 2200 A OD 2.3MM/6.9FR; L 2.2M/7.2FT: Brand: ERBE

## (undated) DEVICE — CUFF BLD PRSS AD CLTH SGL TB W/ BAYNT CONN ROUNDED CORNER

## (undated) DEVICE — SUTURE VCRL SZ 4-0 L27IN ABSRB UD L19MM PS-2 3/8 CIR PRIM J426H

## (undated) DEVICE — KIT,ANTI FOG,W/SPONGE & FLUID,SOFT PACK: Brand: MEDLINE

## (undated) DEVICE — BLADE ASSEMB CLP HAIR FINE --

## (undated) DEVICE — SUTURE SZ 0 27IN 5/8 CIR UR-6  TAPER PT VIOLET ABSRB VICRYL J603H

## (undated) DEVICE — FIAPC® PROBE W/ FILTER 2200 C OD 2.3MM/6.9FR; L 2.2M/7.2FT: Brand: ERBE

## (undated) DEVICE — ENDOSCOPIC KIT COMPLIANCE ENDOKIT

## (undated) DEVICE — ELECTRODE PT RET AD L9FT HI MOIST COND ADH HYDRGEL CORDED

## (undated) DEVICE — DEVICE FIX 5MM TI FOR LAP HERN REP PROTACK

## (undated) DEVICE — TROCAR: Brand: KII® SLEEVE

## (undated) DEVICE — COVER LT HNDL FLX

## (undated) DEVICE — GOWN,SIRUS,NONRNF,SETINSLV,2XL,18/CS: Brand: MEDLINE

## (undated) DEVICE — IV START KIT: Brand: MEDLINE

## (undated) DEVICE — REM POLYHESIVE ADULT PATIENT RETURN ELECTRODE: Brand: VALLEYLAB

## (undated) DEVICE — SET GRAV CK VLV NEEDLESS ST 3 GANGED 4WAY STPCOCK HI FLO 10

## (undated) DEVICE — TRAP ENDOSCP POLYP 2 CHMBR DRAWER TYP

## (undated) DEVICE — SURGICAL PROCEDURE KIT GEN LAPAROSCOPY LF

## (undated) DEVICE — INFECTION CONTROL KIT SYS

## (undated) DEVICE — FORCEPS BX L240CM JAW DIA2.4MM ORNG L CAP W/ NDL DISP RAD

## (undated) DEVICE — INJECTION THERAPY NEEDLE CATHETER: Brand: INTERJECT

## (undated) DEVICE — GARMENT,MEDLINE,DVT,INT,CALF,MED, GEN2: Brand: MEDLINE

## (undated) DEVICE — STRAP,POSITIONING,KNEE/BODY,FOAM,4X60": Brand: MEDLINE

## (undated) DEVICE — DERMABOND SKIN ADH 0.7ML -- DERMABOND ADVANCED 12/BX

## (undated) DEVICE — FORCEPS BX L240CM JAW DIA2.4MM WRK CHN 2.8MM ORNG L CAP W/